# Patient Record
Sex: FEMALE | Race: BLACK OR AFRICAN AMERICAN | Employment: FULL TIME | URBAN - METROPOLITAN AREA
[De-identification: names, ages, dates, MRNs, and addresses within clinical notes are randomized per-mention and may not be internally consistent; named-entity substitution may affect disease eponyms.]

---

## 2019-08-14 ENCOUNTER — APPOINTMENT (OUTPATIENT)
Dept: GENERAL RADIOLOGY | Facility: CLINIC | Age: 59
End: 2019-08-14
Attending: EMERGENCY MEDICINE
Payer: COMMERCIAL

## 2019-08-14 ENCOUNTER — HOSPITAL ENCOUNTER (OUTPATIENT)
Facility: CLINIC | Age: 59
Setting detail: OBSERVATION
Discharge: HOME | End: 2019-08-14
Attending: EMERGENCY MEDICINE | Admitting: INTERNAL MEDICINE
Payer: COMMERCIAL

## 2019-08-14 VITALS
TEMPERATURE: 97.3 F | HEIGHT: 62 IN | OXYGEN SATURATION: 98 % | WEIGHT: 277.5 LBS | BODY MASS INDEX: 51.06 KG/M2 | RESPIRATION RATE: 18 BRPM | SYSTOLIC BLOOD PRESSURE: 117 MMHG | HEART RATE: 84 BPM | DIASTOLIC BLOOD PRESSURE: 82 MMHG

## 2019-08-14 DIAGNOSIS — R07.9 CHEST PAIN, UNSPECIFIED TYPE: ICD-10-CM

## 2019-08-14 LAB
ALBUMIN SERPL-MCNC: 3.3 G/DL (ref 3.4–5)
ALP SERPL-CCNC: 65 U/L (ref 40–150)
ALT SERPL W P-5'-P-CCNC: 25 U/L (ref 0–50)
ANION GAP SERPL CALCULATED.3IONS-SCNC: 6 MMOL/L (ref 3–14)
AST SERPL W P-5'-P-CCNC: 21 U/L (ref 0–45)
BASOPHILS # BLD AUTO: 0 10E9/L (ref 0–0.2)
BASOPHILS NFR BLD AUTO: 0.2 %
BILIRUB DIRECT SERPL-MCNC: <0.1 MG/DL (ref 0–0.2)
BILIRUB SERPL-MCNC: 0.2 MG/DL (ref 0.2–1.3)
BUN SERPL-MCNC: 17 MG/DL (ref 7–30)
CALCIUM SERPL-MCNC: 8.5 MG/DL (ref 8.5–10.1)
CHLORIDE SERPL-SCNC: 100 MMOL/L (ref 94–109)
CHOLEST SERPL-MCNC: 155 MG/DL
CO2 SERPL-SCNC: 31 MMOL/L (ref 20–32)
CREAT SERPL-MCNC: 0.76 MG/DL (ref 0.52–1.04)
CREAT SERPL-MCNC: 0.88 MG/DL (ref 0.52–1.04)
DIFFERENTIAL METHOD BLD: NORMAL
EOSINOPHIL # BLD AUTO: 0.2 10E9/L (ref 0–0.7)
EOSINOPHIL NFR BLD AUTO: 1.8 %
ERYTHROCYTE [DISTWIDTH] IN BLOOD BY AUTOMATED COUNT: 13.8 % (ref 10–15)
ETHANOL SERPL-MCNC: <0.01 G/DL
GFR SERPL CREATININE-BSD FRML MDRD: 73 ML/MIN/{1.73_M2}
GFR SERPL CREATININE-BSD FRML MDRD: 87 ML/MIN/{1.73_M2}
GLUCOSE BLDC GLUCOMTR-MCNC: 117 MG/DL (ref 70–99)
GLUCOSE SERPL-MCNC: 112 MG/DL (ref 70–99)
HCT VFR BLD AUTO: 37.2 % (ref 35–47)
HDLC SERPL-MCNC: 51 MG/DL
HGB BLD-MCNC: 12.7 G/DL (ref 11.7–15.7)
IMM GRANULOCYTES # BLD: 0 10E9/L (ref 0–0.4)
IMM GRANULOCYTES NFR BLD: 0.4 %
INTERPRETATION ECG - MUSE: NORMAL
LDLC SERPL CALC-MCNC: 83 MG/DL
LIPASE SERPL-CCNC: 260 U/L (ref 73–393)
LYMPHOCYTES # BLD AUTO: 1.9 10E9/L (ref 0.8–5.3)
LYMPHOCYTES NFR BLD AUTO: 17.9 %
MAGNESIUM SERPL-MCNC: 1.8 MG/DL (ref 1.6–2.3)
MCH RBC QN AUTO: 32.2 PG (ref 26.5–33)
MCHC RBC AUTO-ENTMCNC: 34.1 G/DL (ref 31.5–36.5)
MCV RBC AUTO: 94 FL (ref 78–100)
MONOCYTES # BLD AUTO: 0.8 10E9/L (ref 0–1.3)
MONOCYTES NFR BLD AUTO: 7.5 %
NEUTROPHILS # BLD AUTO: 7.5 10E9/L (ref 1.6–8.3)
NEUTROPHILS NFR BLD AUTO: 72.2 %
NONHDLC SERPL-MCNC: 104 MG/DL
NRBC # BLD AUTO: 0 10*3/UL
NRBC BLD AUTO-RTO: 0 /100
PHOSPHATE SERPL-MCNC: 2.9 MG/DL (ref 2.5–4.5)
PLATELET # BLD AUTO: 205 10E9/L (ref 150–450)
POTASSIUM SERPL-SCNC: 3.1 MMOL/L (ref 3.4–5.3)
POTASSIUM SERPL-SCNC: 3.6 MMOL/L (ref 3.4–5.3)
PROT SERPL-MCNC: 7.8 G/DL (ref 6.8–8.8)
RBC # BLD AUTO: 3.94 10E12/L (ref 3.8–5.2)
SODIUM SERPL-SCNC: 137 MMOL/L (ref 133–144)
TRIGL SERPL-MCNC: 106 MG/DL
TROPONIN I SERPL-MCNC: <0.015 UG/L (ref 0–0.04)
WBC # BLD AUTO: 10.4 10E9/L (ref 4–11)

## 2019-08-14 PROCEDURE — 25000132 ZZH RX MED GY IP 250 OP 250 PS 637: Performed by: INTERNAL MEDICINE

## 2019-08-14 PROCEDURE — 83735 ASSAY OF MAGNESIUM: CPT | Performed by: INTERNAL MEDICINE

## 2019-08-14 PROCEDURE — 36415 COLL VENOUS BLD VENIPUNCTURE: CPT | Performed by: INTERNAL MEDICINE

## 2019-08-14 PROCEDURE — 25000128 H RX IP 250 OP 636: Performed by: INTERNAL MEDICINE

## 2019-08-14 PROCEDURE — 25000128 H RX IP 250 OP 636: Performed by: EMERGENCY MEDICINE

## 2019-08-14 PROCEDURE — 99204 OFFICE O/P NEW MOD 45 MIN: CPT | Performed by: PSYCHIATRY & NEUROLOGY

## 2019-08-14 PROCEDURE — 80061 LIPID PANEL: CPT | Performed by: INTERNAL MEDICINE

## 2019-08-14 PROCEDURE — 85025 COMPLETE CBC W/AUTO DIFF WBC: CPT | Performed by: EMERGENCY MEDICINE

## 2019-08-14 PROCEDURE — 80076 HEPATIC FUNCTION PANEL: CPT | Performed by: EMERGENCY MEDICINE

## 2019-08-14 PROCEDURE — 71046 X-RAY EXAM CHEST 2 VIEWS: CPT

## 2019-08-14 PROCEDURE — C1894 INTRO/SHEATH, NON-LASER: HCPCS | Performed by: INTERNAL MEDICINE

## 2019-08-14 PROCEDURE — 99285 EMERGENCY DEPT VISIT HI MDM: CPT | Mod: 25

## 2019-08-14 PROCEDURE — 00000146 ZZHCL STATISTIC GLUCOSE BY METER IP

## 2019-08-14 PROCEDURE — 80048 BASIC METABOLIC PNL TOTAL CA: CPT | Performed by: EMERGENCY MEDICINE

## 2019-08-14 PROCEDURE — 83690 ASSAY OF LIPASE: CPT | Performed by: EMERGENCY MEDICINE

## 2019-08-14 PROCEDURE — 25000132 ZZH RX MED GY IP 250 OP 250 PS 637: Performed by: PSYCHIATRY & NEUROLOGY

## 2019-08-14 PROCEDURE — 25800030 ZZH RX IP 258 OP 636: Performed by: INTERNAL MEDICINE

## 2019-08-14 PROCEDURE — 40000235 ZZH STATISTIC TELEMETRY

## 2019-08-14 PROCEDURE — 93005 ELECTROCARDIOGRAM TRACING: CPT

## 2019-08-14 PROCEDURE — 84484 ASSAY OF TROPONIN QUANT: CPT | Performed by: EMERGENCY MEDICINE

## 2019-08-14 PROCEDURE — C1769 GUIDE WIRE: HCPCS | Performed by: INTERNAL MEDICINE

## 2019-08-14 PROCEDURE — 84132 ASSAY OF SERUM POTASSIUM: CPT | Performed by: INTERNAL MEDICINE

## 2019-08-14 PROCEDURE — 40000852 ZZH STATISTIC HEART CATH LAB OR EP LAB

## 2019-08-14 PROCEDURE — C1887 CATHETER, GUIDING: HCPCS | Performed by: INTERNAL MEDICINE

## 2019-08-14 PROCEDURE — 27210794 ZZH OR GENERAL SUPPLY STERILE: Performed by: INTERNAL MEDICINE

## 2019-08-14 PROCEDURE — 99236 HOSP IP/OBS SAME DATE HI 85: CPT | Performed by: INTERNAL MEDICINE

## 2019-08-14 PROCEDURE — 84484 ASSAY OF TROPONIN QUANT: CPT | Mod: 91 | Performed by: INTERNAL MEDICINE

## 2019-08-14 PROCEDURE — 99152 MOD SED SAME PHYS/QHP 5/>YRS: CPT | Performed by: INTERNAL MEDICINE

## 2019-08-14 PROCEDURE — 93458 L HRT ARTERY/VENTRICLE ANGIO: CPT | Performed by: INTERNAL MEDICINE

## 2019-08-14 PROCEDURE — 25000132 ZZH RX MED GY IP 250 OP 250 PS 637

## 2019-08-14 PROCEDURE — 80320 DRUG SCREEN QUANTALCOHOLS: CPT | Performed by: EMERGENCY MEDICINE

## 2019-08-14 PROCEDURE — 25000125 ZZHC RX 250: Performed by: INTERNAL MEDICINE

## 2019-08-14 PROCEDURE — 84100 ASSAY OF PHOSPHORUS: CPT | Performed by: INTERNAL MEDICINE

## 2019-08-14 PROCEDURE — G0378 HOSPITAL OBSERVATION PER HR: HCPCS

## 2019-08-14 PROCEDURE — 99204 OFFICE O/P NEW MOD 45 MIN: CPT | Mod: 25 | Performed by: INTERNAL MEDICINE

## 2019-08-14 PROCEDURE — 82565 ASSAY OF CREATININE: CPT | Performed by: INTERNAL MEDICINE

## 2019-08-14 PROCEDURE — 96374 THER/PROPH/DIAG INJ IV PUSH: CPT | Mod: 59

## 2019-08-14 RX ORDER — ARGATROBAN 1 MG/ML
150 INJECTION, SOLUTION INTRAVENOUS
Status: DISCONTINUED | OUTPATIENT
Start: 2019-08-14 | End: 2019-08-14 | Stop reason: HOSPADM

## 2019-08-14 RX ORDER — NITROGLYCERIN 5 MG/ML
VIAL (ML) INTRAVENOUS
Status: DISCONTINUED | OUTPATIENT
Start: 2019-08-14 | End: 2019-08-14 | Stop reason: HOSPADM

## 2019-08-14 RX ORDER — LIDOCAINE 40 MG/G
CREAM TOPICAL
Status: DISCONTINUED | OUTPATIENT
Start: 2019-08-14 | End: 2019-08-15 | Stop reason: HOSPADM

## 2019-08-14 RX ORDER — ACETAMINOPHEN 325 MG/1
650 TABLET ORAL EVERY 4 HOURS PRN
Status: DISCONTINUED | OUTPATIENT
Start: 2019-08-14 | End: 2019-08-15 | Stop reason: HOSPADM

## 2019-08-14 RX ORDER — ALPRAZOLAM 0.5 MG
0.5 TABLET ORAL 2 TIMES DAILY PRN
COMMUNITY

## 2019-08-14 RX ORDER — METOPROLOL TARTRATE 50 MG
50 TABLET ORAL DAILY
COMMUNITY

## 2019-08-14 RX ORDER — ACETAMINOPHEN 650 MG/1
650 SUPPOSITORY RECTAL EVERY 4 HOURS PRN
Status: DISCONTINUED | OUTPATIENT
Start: 2019-08-14 | End: 2019-08-15 | Stop reason: HOSPADM

## 2019-08-14 RX ORDER — NALOXONE HYDROCHLORIDE 0.4 MG/ML
.1-.4 INJECTION, SOLUTION INTRAMUSCULAR; INTRAVENOUS; SUBCUTANEOUS
Status: DISCONTINUED | OUTPATIENT
Start: 2019-08-14 | End: 2019-08-15 | Stop reason: HOSPADM

## 2019-08-14 RX ORDER — ALPRAZOLAM 0.5 MG
0.5 TABLET ORAL 2 TIMES DAILY PRN
Status: DISCONTINUED | OUTPATIENT
Start: 2019-08-14 | End: 2019-08-15 | Stop reason: HOSPADM

## 2019-08-14 RX ORDER — POTASSIUM CL/LIDO/0.9 % NACL 10MEQ/0.1L
10 INTRAVENOUS SOLUTION, PIGGYBACK (ML) INTRAVENOUS
Status: DISCONTINUED | OUTPATIENT
Start: 2019-08-14 | End: 2019-08-15 | Stop reason: HOSPADM

## 2019-08-14 RX ORDER — DOPAMINE HYDROCHLORIDE 160 MG/100ML
2-20 INJECTION, SOLUTION INTRAVENOUS CONTINUOUS PRN
Status: DISCONTINUED | OUTPATIENT
Start: 2019-08-14 | End: 2019-08-14 | Stop reason: HOSPADM

## 2019-08-14 RX ORDER — METOPROLOL TARTRATE 25 MG/1
25 TABLET, FILM COATED ORAL 2 TIMES DAILY
Status: DISCONTINUED | OUTPATIENT
Start: 2019-08-14 | End: 2019-08-15 | Stop reason: HOSPADM

## 2019-08-14 RX ORDER — ALBUTEROL SULFATE 90 UG/1
1-2 AEROSOL, METERED RESPIRATORY (INHALATION) EVERY 6 HOURS PRN
COMMUNITY

## 2019-08-14 RX ORDER — HYDROCODONE BITARTRATE AND ACETAMINOPHEN 5; 325 MG/1; MG/1
1 TABLET ORAL 2 TIMES DAILY PRN
COMMUNITY

## 2019-08-14 RX ORDER — LEVOTHYROXINE SODIUM 125 UG/1
125 TABLET ORAL DAILY
COMMUNITY

## 2019-08-14 RX ORDER — VERAPAMIL HYDROCHLORIDE 2.5 MG/ML
INJECTION, SOLUTION INTRAVENOUS
Status: DISCONTINUED | OUTPATIENT
Start: 2019-08-14 | End: 2019-08-14 | Stop reason: HOSPADM

## 2019-08-14 RX ORDER — EPTIFIBATIDE 2 MG/ML
180 INJECTION, SOLUTION INTRAVENOUS EVERY 10 MIN PRN
Status: DISCONTINUED | OUTPATIENT
Start: 2019-08-14 | End: 2019-08-14 | Stop reason: HOSPADM

## 2019-08-14 RX ORDER — NALOXONE HYDROCHLORIDE 0.4 MG/ML
.2-.4 INJECTION, SOLUTION INTRAMUSCULAR; INTRAVENOUS; SUBCUTANEOUS
Status: CANCELLED | OUTPATIENT
Start: 2019-08-14 | End: 2019-08-15

## 2019-08-14 RX ORDER — EPTIFIBATIDE 2 MG/ML
15 INJECTION, SOLUTION INTRAVENOUS CONTINUOUS PRN
Status: DISCONTINUED | OUTPATIENT
Start: 2019-08-14 | End: 2019-08-14 | Stop reason: HOSPADM

## 2019-08-14 RX ORDER — POLYETHYLENE GLYCOL 3350 17 G/17G
5 POWDER, FOR SOLUTION ORAL EVERY OTHER DAY
COMMUNITY

## 2019-08-14 RX ORDER — LEVOTHYROXINE SODIUM 125 UG/1
125 TABLET ORAL DAILY
Status: DISCONTINUED | OUTPATIENT
Start: 2019-08-15 | End: 2019-08-15 | Stop reason: HOSPADM

## 2019-08-14 RX ORDER — FLUTICASONE PROPIONATE 50 MCG
1 SPRAY, SUSPENSION (ML) NASAL DAILY PRN
COMMUNITY

## 2019-08-14 RX ORDER — POTASSIUM CHLORIDE 1.5 G/1.58G
20-40 POWDER, FOR SOLUTION ORAL
Status: DISCONTINUED | OUTPATIENT
Start: 2019-08-14 | End: 2019-08-15 | Stop reason: HOSPADM

## 2019-08-14 RX ORDER — POTASSIUM CHLORIDE 1500 MG/1
20-40 TABLET, EXTENDED RELEASE ORAL
Status: DISCONTINUED | OUTPATIENT
Start: 2019-08-14 | End: 2019-08-15 | Stop reason: HOSPADM

## 2019-08-14 RX ORDER — ATORVASTATIN CALCIUM 40 MG/1
40 TABLET, FILM COATED ORAL DAILY
Qty: 30 TABLET | Refills: 1 | Status: SHIPPED | OUTPATIENT
Start: 2019-08-14

## 2019-08-14 RX ORDER — FOLIC ACID 1 MG/1
1 TABLET ORAL DAILY
Status: DISCONTINUED | OUTPATIENT
Start: 2019-08-14 | End: 2019-08-15 | Stop reason: HOSPADM

## 2019-08-14 RX ORDER — LORAZEPAM 0.5 MG/1
0.5 TABLET ORAL
Status: DISCONTINUED | OUTPATIENT
Start: 2019-08-14 | End: 2019-08-14 | Stop reason: HOSPADM

## 2019-08-14 RX ORDER — ATROPINE SULFATE 0.1 MG/ML
0.5 INJECTION INTRAVENOUS EVERY 5 MIN PRN
Status: CANCELLED | OUTPATIENT
Start: 2019-08-14 | End: 2019-08-15

## 2019-08-14 RX ORDER — LANOLIN ALCOHOL/MO/W.PET/CERES
100 CREAM (GRAM) TOPICAL DAILY
Status: DISCONTINUED | OUTPATIENT
Start: 2019-08-14 | End: 2019-08-15 | Stop reason: HOSPADM

## 2019-08-14 RX ORDER — NITROGLYCERIN 0.4 MG/1
0.4 TABLET SUBLINGUAL EVERY 5 MIN PRN
Status: COMPLETED | OUTPATIENT
Start: 2019-08-14 | End: 2019-08-14

## 2019-08-14 RX ORDER — FENTANYL CITRATE 50 UG/ML
INJECTION, SOLUTION INTRAMUSCULAR; INTRAVENOUS
Status: DISCONTINUED | OUTPATIENT
Start: 2019-08-14 | End: 2019-08-14 | Stop reason: HOSPADM

## 2019-08-14 RX ORDER — ASPIRIN 81 MG/1
81 TABLET ORAL DAILY
Status: DISCONTINUED | OUTPATIENT
Start: 2019-08-15 | End: 2019-08-15 | Stop reason: HOSPADM

## 2019-08-14 RX ORDER — SODIUM CHLORIDE 9 MG/ML
INJECTION, SOLUTION INTRAVENOUS CONTINUOUS
Status: DISCONTINUED | OUTPATIENT
Start: 2019-08-14 | End: 2019-08-15 | Stop reason: HOSPADM

## 2019-08-14 RX ORDER — VENLAFAXINE HYDROCHLORIDE 75 MG/1
75 CAPSULE, EXTENDED RELEASE ORAL DAILY
COMMUNITY

## 2019-08-14 RX ORDER — NITROGLYCERIN 0.4 MG/1
TABLET SUBLINGUAL
Status: COMPLETED
Start: 2019-08-14 | End: 2019-08-14

## 2019-08-14 RX ORDER — POTASSIUM CHLORIDE 7.45 MG/ML
10 INJECTION INTRAVENOUS
Status: DISCONTINUED | OUTPATIENT
Start: 2019-08-14 | End: 2019-08-15 | Stop reason: HOSPADM

## 2019-08-14 RX ORDER — LORAZEPAM 2 MG/ML
0.5 INJECTION INTRAMUSCULAR ONCE
Status: COMPLETED | OUTPATIENT
Start: 2019-08-14 | End: 2019-08-14

## 2019-08-14 RX ORDER — LEVOTHYROXINE SODIUM 50 UG/1
50 TABLET ORAL DAILY
Status: DISCONTINUED | OUTPATIENT
Start: 2019-08-14 | End: 2019-08-14

## 2019-08-14 RX ORDER — HYDROCODONE BITARTRATE AND ACETAMINOPHEN 5; 325 MG/1; MG/1
1-2 TABLET ORAL EVERY 4 HOURS PRN
Status: DISCONTINUED | OUTPATIENT
Start: 2019-08-14 | End: 2019-08-15 | Stop reason: HOSPADM

## 2019-08-14 RX ORDER — HYDROCHLOROTHIAZIDE 50 MG/1
50 TABLET ORAL DAILY
COMMUNITY

## 2019-08-14 RX ORDER — METOPROLOL TARTRATE 50 MG
50-100 TABLET ORAL
Status: DISCONTINUED | OUTPATIENT
Start: 2019-08-14 | End: 2019-08-15 | Stop reason: HOSPADM

## 2019-08-14 RX ORDER — NITROGLYCERIN 0.4 MG/1
0.4 TABLET SUBLINGUAL EVERY 5 MIN PRN
Status: DISCONTINUED | OUTPATIENT
Start: 2019-08-14 | End: 2019-08-15 | Stop reason: HOSPADM

## 2019-08-14 RX ORDER — DOBUTAMINE HYDROCHLORIDE 200 MG/100ML
2-20 INJECTION INTRAVENOUS CONTINUOUS PRN
Status: DISCONTINUED | OUTPATIENT
Start: 2019-08-14 | End: 2019-08-14 | Stop reason: HOSPADM

## 2019-08-14 RX ORDER — LORAZEPAM 2 MG/ML
0.5 INJECTION INTRAMUSCULAR
Status: DISCONTINUED | OUTPATIENT
Start: 2019-08-14 | End: 2019-08-14 | Stop reason: HOSPADM

## 2019-08-14 RX ORDER — HEPARIN SODIUM 1000 [USP'U]/ML
INJECTION, SOLUTION INTRAVENOUS; SUBCUTANEOUS
Status: DISCONTINUED | OUTPATIENT
Start: 2019-08-14 | End: 2019-08-14 | Stop reason: HOSPADM

## 2019-08-14 RX ORDER — ALUMINA, MAGNESIA, AND SIMETHICONE 2400; 2400; 240 MG/30ML; MG/30ML; MG/30ML
30 SUSPENSION ORAL EVERY 4 HOURS PRN
Status: DISCONTINUED | OUTPATIENT
Start: 2019-08-14 | End: 2019-08-15 | Stop reason: HOSPADM

## 2019-08-14 RX ORDER — FLUMAZENIL 0.1 MG/ML
0.2 INJECTION, SOLUTION INTRAVENOUS
Status: CANCELLED | OUTPATIENT
Start: 2019-08-14 | End: 2019-08-15

## 2019-08-14 RX ORDER — MAGNESIUM SULFATE HEPTAHYDRATE 40 MG/ML
4 INJECTION, SOLUTION INTRAVENOUS EVERY 4 HOURS PRN
Status: DISCONTINUED | OUTPATIENT
Start: 2019-08-14 | End: 2019-08-15 | Stop reason: HOSPADM

## 2019-08-14 RX ORDER — ARGATROBAN 1 MG/ML
350 INJECTION, SOLUTION INTRAVENOUS
Status: DISCONTINUED | OUTPATIENT
Start: 2019-08-14 | End: 2019-08-14 | Stop reason: HOSPADM

## 2019-08-14 RX ORDER — METOPROLOL TARTRATE 50 MG
50-100 TABLET ORAL
Status: DISCONTINUED | OUTPATIENT
Start: 2019-08-14 | End: 2019-08-14

## 2019-08-14 RX ORDER — FENTANYL CITRATE 50 UG/ML
25-50 INJECTION, SOLUTION INTRAMUSCULAR; INTRAVENOUS
Status: CANCELLED | OUTPATIENT
Start: 2019-08-14 | End: 2019-08-15

## 2019-08-14 RX ORDER — NALOXONE HYDROCHLORIDE 0.4 MG/ML
.1-.4 INJECTION, SOLUTION INTRAMUSCULAR; INTRAVENOUS; SUBCUTANEOUS
Status: CANCELLED | OUTPATIENT
Start: 2019-08-14

## 2019-08-14 RX ORDER — NITROGLYCERIN 20 MG/100ML
.07-1.59 INJECTION INTRAVENOUS CONTINUOUS PRN
Status: DISCONTINUED | OUTPATIENT
Start: 2019-08-14 | End: 2019-08-14 | Stop reason: HOSPADM

## 2019-08-14 RX ORDER — CARISOPRODOL 350 MG/1
350 TABLET ORAL 2 TIMES DAILY PRN
COMMUNITY

## 2019-08-14 RX ORDER — SODIUM CHLORIDE 9 MG/ML
INJECTION, SOLUTION INTRAVENOUS CONTINUOUS
Status: DISCONTINUED | OUTPATIENT
Start: 2019-08-14 | End: 2019-08-14 | Stop reason: HOSPADM

## 2019-08-14 RX ORDER — POTASSIUM CHLORIDE 29.8 MG/ML
20 INJECTION INTRAVENOUS
Status: DISCONTINUED | OUTPATIENT
Start: 2019-08-14 | End: 2019-08-15 | Stop reason: HOSPADM

## 2019-08-14 RX ORDER — NOREPINEPHRINE BITARTRATE 0.06 MG/ML
.03-.4 INJECTION, SOLUTION INTRAVENOUS CONTINUOUS PRN
Status: DISCONTINUED | OUTPATIENT
Start: 2019-08-14 | End: 2019-08-14 | Stop reason: HOSPADM

## 2019-08-14 RX ORDER — MULTIPLE VITAMINS W/ MINERALS TAB 9MG-400MCG
1 TAB ORAL DAILY
Status: DISCONTINUED | OUTPATIENT
Start: 2019-08-14 | End: 2019-08-15 | Stop reason: HOSPADM

## 2019-08-14 RX ORDER — POTASSIUM CHLORIDE 1500 MG/1
20 TABLET, EXTENDED RELEASE ORAL
Status: DISCONTINUED | OUTPATIENT
Start: 2019-08-14 | End: 2019-08-14 | Stop reason: HOSPADM

## 2019-08-14 RX ORDER — LIDOCAINE 40 MG/G
CREAM TOPICAL
Status: DISCONTINUED | OUTPATIENT
Start: 2019-08-14 | End: 2019-08-14 | Stop reason: HOSPADM

## 2019-08-14 RX ORDER — LORAZEPAM 0.5 MG/1
0.5 TABLET ORAL 2 TIMES DAILY PRN
Status: DISCONTINUED | OUTPATIENT
Start: 2019-08-14 | End: 2019-08-14

## 2019-08-14 RX ORDER — VENLAFAXINE HYDROCHLORIDE 75 MG/1
75 CAPSULE, EXTENDED RELEASE ORAL
Status: DISCONTINUED | OUTPATIENT
Start: 2019-08-14 | End: 2019-08-15 | Stop reason: HOSPADM

## 2019-08-14 RX ADMIN — NITROGLYCERIN 0.4 MG: 0.4 TABLET SUBLINGUAL at 04:58

## 2019-08-14 RX ADMIN — LEVOTHYROXINE SODIUM 50 MCG: 50 TABLET ORAL at 10:08

## 2019-08-14 RX ADMIN — LORAZEPAM 0.5 MG: 2 INJECTION INTRAMUSCULAR; INTRAVENOUS at 06:23

## 2019-08-14 RX ADMIN — METOPROLOL TARTRATE 25 MG: 25 TABLET ORAL at 10:07

## 2019-08-14 RX ADMIN — Medication 100 MG: at 11:36

## 2019-08-14 RX ADMIN — FOLIC ACID 1 MG: 1 TABLET ORAL at 11:36

## 2019-08-14 RX ADMIN — POTASSIUM CHLORIDE 40 MEQ: 1500 TABLET, EXTENDED RELEASE ORAL at 10:15

## 2019-08-14 RX ADMIN — MULTIPLE VITAMINS W/ MINERALS TAB 1 TABLET: TAB at 11:36

## 2019-08-14 RX ADMIN — NITROGLYCERIN 0.4 MG: 0.4 TABLET SUBLINGUAL at 04:53

## 2019-08-14 RX ADMIN — VENLAFAXINE HYDROCHLORIDE 75 MG: 75 CAPSULE, EXTENDED RELEASE ORAL at 11:36

## 2019-08-14 RX ADMIN — NITROGLYCERIN 0.4 MG: 0.4 TABLET SUBLINGUAL at 09:08

## 2019-08-14 RX ADMIN — SODIUM CHLORIDE: 9 INJECTION, SOLUTION INTRAVENOUS at 12:53

## 2019-08-14 RX ADMIN — NITROGLYCERIN 0.4 MG: 0.4 TABLET SUBLINGUAL at 05:11

## 2019-08-14 RX ADMIN — METOPROLOL TARTRATE 25 MG: 25 TABLET ORAL at 20:40

## 2019-08-14 RX ADMIN — NITROGLYCERIN 0.4 MG: 0.4 TABLET SUBLINGUAL at 09:20

## 2019-08-14 ASSESSMENT — ENCOUNTER SYMPTOMS
DIAPHORESIS: 0
SHORTNESS OF BREATH: 1
NERVOUS/ANXIOUS: 1
LIGHT-HEADEDNESS: 0

## 2019-08-14 ASSESSMENT — MIFFLIN-ST. JEOR: SCORE: 1796.98

## 2019-08-14 NOTE — PRE-PROCEDURE
I have examined the patient, reviewed the history, medications and pre procedural tests. Prolonged recurrent chest pain with negative troponin levels, but known CAD documented previously on CTA.  I have explained to the patient the risks of death, MI, stroke, hematoma, possible urgent bypass surgery for failed PCI, use of stents, thienopyridine agents, possible peripheral vascular complications, arrhythmia, the use of FFR in clinical decision-making and alternative of medical therapy alone in regards to left heart catheterization, left ventriculography, coronary angiography, and possible percutaneous coronary intervention. The patient voiced understanding and wishes to proceed. The patient has a good right radial pulse, normal ulnar pulse and a normal Nayan's sign.

## 2019-08-14 NOTE — PROGRESS NOTES
PRIOR TO DISCHARGE     Comments: List all goals to be met before discharge home:   - Serial troponins and stress test complete.  NOT MET  - Seen and cleared by consultant if applicable NOT MET  - Adequate pain control on oral analgesia MET  - Vital signs normal or at patient baseline MET  - Safe disposition plan has been identified NOT MET  - Nurse to notify provider when observation goals  have been met and patient is ready for discharge.

## 2019-08-14 NOTE — PROCEDURES
[facility]    Procedure: *Cath without PCI  Date/Time: 8/14/2019 3:33 PM  Performed by: Yordan Flores MD  Authorized by: Yordan Flores MD     UNIVERSAL PROTOCOL   Site Marked: Yes  Prior Images Obtained and Reviewed:  Yes  Required items: Required blood products, implants, devices and special equipment available    Patient identity confirmed:  Verbally with patient  Patient was reevaluated immediately before administering moderate or deep sedation or anesthesia  Confirmation Checklist:  Patient's identity using two indicators  Time out: Immediately prior to the procedure a time out was called    Preparation: Patient was prepped and draped in usual sterile fashion       ANESTHESIA    Local Anesthetic: Lidocaine 1% without epinephrine      SEDATION    Patient Sedated: Yes    Sedation:  Midazolam and fentanyl  Vital signs: Vital signs monitored during sedation    PROCEDURE   Patient Tolerance:  Patient tolerated the procedure well with no immediate complications    Time of Sedation in Minutes by Physician:  30

## 2019-08-14 NOTE — PROGRESS NOTES
RECEIVING UNIT ED HANDOFF REVIEW    ED Nurse Handoff Report was reviewed by: George Kowalski on August 14, 2019 at 6:50 AM

## 2019-08-14 NOTE — PROGRESS NOTES
1535 Pt returned from Cath Lab. A/O. TR band intact to right wrist.  No oozing or hematoma noted. Area soft & flat. Right arm elevated on pillows. Arm board applied. Pt denies pain. Pt instructed on activity restrictions with right wrist. Verbal understanding received from pt.  1545 Dr Flores at bedside to speak with pt.  1600 Pt's friend at bedside. Detailed update given. No family present at this time.  1650 Pt taking diet & flds well. No complaints.  1700 Air released from TR band per protocol.  1715 OOB - steady on feet. Ambulated in halls to bathroom with good hood. No change in puncture site assessment with activity.  1845 TR band removed. Bandaid applied to site. Area soft & flat.  1855 Detailed report called to BLESSING Weems in OBS.  1920 Pt transferred to OBS rm 22. All personal belongings sent w/ pt.  1930 Verbal report given to BLESSING Burgos. Site checked together. Bandaid CDI - area soft & flat.

## 2019-08-14 NOTE — CONSULTS
"Cardiology Consultation      Dulce Maria Sandhu MRN# [id number]   YOB: 1960 Age: 58 year old   Date of Admission: 2019     Reason for consult: Chest pain           Assessment and Plan:   Active Problems:    * No active hospital problems. *       1. Chest discomfort    Characteristics c/w angina with radiation to left arm, SOB    However with duration of discomfort and negative EKG and troponins make noncardiac more likely    Prior abnormal stress test    Discussed invasive vs. CTA    Patient prefers to go with CT angiogram    BB given    Will need anti anxiety meds    Patient with significant stress      2. HTN              3. PACs              4. Obesity              5. Anxiety disorder                     Chief Complaint:   Chest Pain (Woke up with chest pain that radiates into left shoulder and feels SOB. anxious. Given 324mg ASA by EMS.)           History of Present Illness:   This patient is a 58 year old female with prior history of hypertension, presents with chest pressure awakening her at 2am.  Persisting even to now however improved with 3 sl ntg.  No EKG changes. Troponin negative.  Prior stress nuclear scan  showing anterior ischemia mild however likely breast attenuation artifact.  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  Otherwise ROS negative.          Physical Exam:   Vitals were reviewed  Blood pressure 137/78, pulse 81, temperature 96.4  F (35.8  C), temperature source Oral, resp. rate 20, height 1.575 m (5' 2\"), weight 125.9 kg (277 lb 8 oz), SpO2 100 %.  Temperatures:  Current - Temp: 96.4  F (35.8  C); Max - Temp  Av.6  F (36.4  C)  Min: 96.4  F (35.8  C)  Max: 98.8  F (37.1  C)  Respiration range: Resp  Avg: 15.8  Min: 10  Max: 24  Pulse range: Pulse  Av.8  Min: 81  Max: 98  Blood pressure range: Systolic (24hrs), Av , Min:109 , Max:163   ; Diastolic (24hrs), Av, Min:68, Max:101    Pulse oximetry range: " SpO2  Av %  Min: 98 %  Max: 100 %  No intake or output data in the 24 hours ending 19 0856  Constitutional:   awake, alert, cooperative, no apparent distress, and appears stated age     Eyes:   Lids and lashes normal, pupils equal, round and reactive to light, extra ocular muscles intact, sclera clear, conjunctiva normal     Neck:   supple, symmetrical, trachea midline, no JVD     Back:   symmetric     Lungs:   No increased work of breathing, good air exchange, clear to auscultation bilaterally, no crackles or wheezing  clear to auscultation     Cardiovascular:   Normal apical impulse, regular rate and rhythm, normal S1 and S2, no S3 or S4, and no murmur noted.   , , , pulses 2 plus all extermities bilaterally  carotids without bruits bilaterally     Abdomen:   non-tender     Musculoskeletal:   motor strength is 5 out of 5 all extremities bilaterally     Neurologic:   Grossly nonfocal     Skin:   no bruising or bleeding     Additional findings:   Edema   No edema               Past Medical History:   I have reviewed this patient's past medical history  Past Medical History:   Diagnosis Date     Anxiety      Asthma      Hypertension      Sleep apnea     uses c-pap     Thyroid disease              Past Surgical History:   I have reviewed this patient's past surgical history  Past Surgical History:   Procedure Laterality Date     COLONOSCOPY  10/24/2012    Procedure: COLONOSCOPY;  COLONOSCOPY ;  Surgeon: Sunil Valdes MD;  Location:  GI     GYN SURGERY      3 c sections     THYROID SURGERY                 Social History:   I have reviewed this patient's social history  Social History     Tobacco Use     Smoking status: Current Some Day Smoker     Packs/day: 0.50     Years: 35.00     Pack years: 17.50     Types: Cigarettes     Smokeless tobacco: Never Used   Substance Use Topics     Alcohol use: Yes     Comment: 3 bottles vodka a week             Family History:   I have reviewed this patient's  family history  Family History   Problem Relation Age of Onset     Cerebrovascular Disease Mother      Cerebrovascular Disease Brother      Cerebrovascular Disease Sister              Allergies:     Allergies   Allergen Reactions     Sulfa Drugs Hives             Medications:   I have reviewed this patient's current medications  Medications Prior to Admission   Medication Sig Dispense Refill Last Dose     Carisoprodol (SOMA PO) Take 350 mg by mouth as needed.     8/13/2019 at Unknown time     HYDROcodone-acetaminophen (LORTAB)  MG per tablet Take 1 tablet by mouth every 6 hours as needed.     1/10/2014     LEVOTHYROXINE SODIUM PO Take 50 mcg by mouth    8/13/2019 at Unknown time     LORazepam (ATIVAN PO) Take  by mouth.     Past Week     METOPROLOL SUCCINATE PO Take by mouth daily        potassium chloride SA (K-DUR,KLOR-CON M) 10 MEQ tablet Take by mouth 2 times daily   8/13/2019 at Unknown time     albuterol (2.5 MG/3ML) 0.083% nebulizer solution Take 3 mLs (2.5 mg) by nebulization every 4 hours as needed for shortness of breath / dyspnea 1 Box 0      ALBUTEROL IN Inhale  into the lungs.     1/11/2014     Current Facility-Administered Medications Ordered in Epic   Medication Dose Route Frequency Last Rate Last Dose     acetaminophen (TYLENOL) Suppository 650 mg  650 mg Rectal Q4H PRN         acetaminophen (TYLENOL) tablet 650 mg  650 mg Oral Q4H PRN         alum & mag hydroxide-simethicone (MYLANTA ES/MAALOX  ES) suspension 30 mL  30 mL Oral Q4H PRN         [START ON 8/15/2019] aspirin EC tablet 81 mg  81 mg Oral Daily         levothyroxine (SYNTHROID/LEVOTHROID) tablet 50 mcg  50 mcg Oral Daily         lidocaine (LMX4) cream   Topical Q1H PRN         lidocaine 1 % 0.1-1 mL  0.1-1 mL Other Q1H PRN         LORazepam (ATIVAN) tablet 0.5 mg  0.5 mg Oral BID PRN         metoprolol tartrate (LOPRESSOR) tablet 25 mg  25 mg Oral BID         naloxone (NARCAN) injection 0.1-0.4 mg  0.1-0.4 mg Intravenous Q2 Min PRN          nitroGLYcerin (NITROSTAT) sublingual tablet 0.4 mg  0.4 mg Sublingual Q5 Min PRN         potassium chloride (KLOR-CON) Packet 20-40 mEq  20-40 mEq Oral or Feeding Tube Q2H PRN         potassium chloride 10 mEq in 100 mL intermittent infusion with 10 mg lidocaine  10 mEq Intravenous Q1H PRN         potassium chloride 10 mEq in 100 mL sterile water intermittent infusion (premix)  10 mEq Intravenous Q1H PRN         potassium chloride 20 mEq in 50 mL intermittent infusion  20 mEq Intravenous Q1H PRN         potassium chloride ER (K-DUR/KLOR-CON M) CR tablet 20-40 mEq  20-40 mEq Oral Q2H PRN         sodium chloride (PF) 0.9% PF flush 3 mL  3 mL Intracatheter q1 min prn         sodium chloride (PF) 0.9% PF flush 3 mL  3 mL Intracatheter Q8H         No current Epic-ordered outpatient medications on file.             Review of Systems:   The 10 point Review of Systems is negative other than noted in the HPI            Data:   All laboratory data reviewed  Results for orders placed or performed during the hospital encounter of 08/14/19 (from the past 24 hour(s))   EKG 12 lead   Result Value Ref Range    Interpretation ECG Click View Image link to view waveform and result    CBC with platelets differential   Result Value Ref Range    WBC 10.4 4.0 - 11.0 10e9/L    RBC Count 3.94 3.8 - 5.2 10e12/L    Hemoglobin 12.7 11.7 - 15.7 g/dL    Hematocrit 37.2 35.0 - 47.0 %    MCV 94 78 - 100 fl    MCH 32.2 26.5 - 33.0 pg    MCHC 34.1 31.5 - 36.5 g/dL    RDW 13.8 10.0 - 15.0 %    Platelet Count 205 150 - 450 10e9/L    Diff Method Automated Method     % Neutrophils 72.2 %    % Lymphocytes 17.9 %    % Monocytes 7.5 %    % Eosinophils 1.8 %    % Basophils 0.2 %    % Immature Granulocytes 0.4 %    Nucleated RBCs 0 0 /100    Absolute Neutrophil 7.5 1.6 - 8.3 10e9/L    Absolute Lymphocytes 1.9 0.8 - 5.3 10e9/L    Absolute Monocytes 0.8 0.0 - 1.3 10e9/L    Absolute Eosinophils 0.2 0.0 - 0.7 10e9/L    Absolute Basophils 0.0 0.0 - 0.2  10e9/L    Abs Immature Granulocytes 0.0 0 - 0.4 10e9/L    Absolute Nucleated RBC 0.0    Basic metabolic panel   Result Value Ref Range    Sodium 137 133 - 144 mmol/L    Potassium 3.1 (L) 3.4 - 5.3 mmol/L    Chloride 100 94 - 109 mmol/L    Carbon Dioxide 31 20 - 32 mmol/L    Anion Gap 6 3 - 14 mmol/L    Glucose 112 (H) 70 - 99 mg/dL    Urea Nitrogen 17 7 - 30 mg/dL    Creatinine 0.88 0.52 - 1.04 mg/dL    GFR Estimate 73 >60 mL/min/[1.73_m2]    GFR Estimate If Black 85 >60 mL/min/[1.73_m2]    Calcium 8.5 8.5 - 10.1 mg/dL   Troponin I   Result Value Ref Range    Troponin I ES <0.015 0.000 - 0.045 ug/L   Hepatic panel   Result Value Ref Range    Bilirubin Direct <0.1 0.0 - 0.2 mg/dL    Bilirubin Total 0.2 0.2 - 1.3 mg/dL    Albumin 3.3 (L) 3.4 - 5.0 g/dL    Protein Total 7.8 6.8 - 8.8 g/dL    Alkaline Phosphatase 65 40 - 150 U/L    ALT 25 0 - 50 U/L    AST 21 0 - 45 U/L   Alcohol ethyl   Result Value Ref Range    Ethanol g/dL <0.01 <0.01 g/dL   Lipase   Result Value Ref Range    Lipase 260 73 - 393 U/L   XR Chest 2 Views    Narrative    XR CHEST 2 VW  8/14/2019 5:08 AM     INDICATION: Chest pain.    COMPARISON: 1/11/2014.      Impression    IMPRESSION: Shallow inspiration accentuates the heart size. No focal  air-space disease or other acute findings.    OMEGA BORGES MD   Troponin I - Now then in 4 hours x 2   Result Value Ref Range    Troponin I ES <0.015 0.000 - 0.045 ug/L   Lipid panel reflex to direct LDL   Result Value Ref Range    Cholesterol 155 <200 mg/dL    Triglycerides 106 <150 mg/dL    HDL Cholesterol 51 >49 mg/dL    LDL Cholesterol Calculated 83 <100 mg/dL    Non HDL Cholesterol 104 <130 mg/dL     EKG results:   I have reviewed this patient's EKG with the following interpretation:        NSR, PACs , no acute ST changes     Echocardiology:   Not performed to date        Results:

## 2019-08-14 NOTE — DISCHARGE INSTRUCTIONS

## 2019-08-14 NOTE — PROGRESS NOTES
Care Suites Admission Nursing Note    Reason for admission: Cath lab hold  CS arrival time: ~1430  Accompanied by: Friend Christi Song  Name/phone of DC : Here  Medications held: No  Consent signed: Yes  Abnormal assessment/labs: NO  If abnormal, provider notified: NO  Education/questions answered: Dr. Flores explained the procedure and addressed questions and concerns  Plan: Angiogram ~1500

## 2019-08-14 NOTE — PRE-PROCEDURE
GENERAL PRE-PROCEDURE:   Procedure:  Coronary angiogram,  possibel Left heart catheterization, possible Left ventriculogram, possible PCI  Date/Time:  8/14/2019 2:40 PM    Verbal consent obtained?: Yes    Written consent obtained?: Yes    Risks and benefits: Risks, benefits and alternatives were discussed    Consent given by:  Patient  Patient states understanding of procedure being performed: Yes    Patient's understanding of procedure matches consent: Yes    Procedure consent matches procedure scheduled: Yes    Expected level of sedation:  Moderate  Appropriately NPO:  Yes  ASA Class:  Class 2- mild systemic disease, no acute problems, no functional limitations  Mallampati  :  Grade 2- soft palate, base of uvula, tonsillar pillars, and portion of posterior pharyngeal wall visible  Lungs:  Lungs clear with good breath sounds bilaterally  Heart:  Normal heart sounds and rate  History & Physical reviewed:  History and physical reviewed and no updates needed  Statement of review:  I have reviewed the lab findings, diagnostic data, medications, and the plan for sedation

## 2019-08-14 NOTE — ED PROVIDER NOTES
History     Chief Complaint:  Chest Pain     The history is provided by the patient and the spouse.      Dulce Maria Sandhu is a 58 year old female with a history of anxiety and hypertension who presents to the emergency department via EMS for evaluation of chest pain. 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 Prior to arrival, EMS states that the patient woke up around 0100 with the sudden onset of left shoulder pain that developed into intense chest pressure and shortness of breath. She notes that she has never experienced this feeling before. She then called EMS and was very anxious when they arrived. Upon arrival, she was given 324 mg of Aspirin and was found to have stable vitals with O2 sats at 100. With just the Aspirin, her chest pain went from an 8/10 to a 4/10 on pain severity scale. They then transferred here for further evaluation. EMS notes that her shortness of breath and anxiety has subsided since they first arrived.     Here, the patient still has slight chest pressure rated at a 4/10 that no longer radiates anywhere else. Patient denies any diaphoresis or lightheadedness. She also does not get much exercise. She occasionally takes medication for her anxiety and regularly takes hypertension medication in the morning. To note, her last stress test was in 2015.    Cardiac/PE/DVT Risk Factors:  The patient has a history of hypertension, but no history hyperlipidemia or diabetes. She is a heavy smoker. The patient's mother had a blood clot but no personal history of PE, DVT, or clotting disorder. The patient reports denies recent travel, surgery, or other immobilizations. She does not take any estrogen or hormonal supplements.     Allergies:  Sulfa drugs      Medications:    Albuterol  Metoprolol  Ativan  Levothyroxine     Past Medical History:    Anxiety  Asthma  Hypertension  Sleep apnea  Thyroid  "disease    Past Surgical History:     x3  Thyroid surgery  Colonoscopy     Family History:    Cerebrovascular disease    Social History:  Positive for tobacco use: 0.5 ppd  Positive for alcohol use: 3 L of vodka per week  Negative for drug use.  Marital Status:        Review of Systems   Constitutional: Negative for diaphoresis.   Respiratory: Positive for shortness of breath.    Cardiovascular: Positive for chest pain.   Neurological: Negative for light-headedness.   Psychiatric/Behavioral: The patient is nervous/anxious.    All other systems reviewed and are negative.      Physical Exam     Patient Vitals for the past 24 hrs:   BP Temp Temp src Pulse Heart Rate Resp SpO2 Height   19 0600 109/72 -- -- 83 81 12 -- --   19 0520 (!) 143/88 -- -- 90 82 10 -- --   19 0510 (!) 144/82 -- -- 91 -- -- -- --   19 0500 (!) 144/88 -- -- 92 90 12 -- --   19 0455 136/70 -- -- 98 92 11 -- --   19 0440 (!) 163/100 -- -- 91 83 15 -- --   19 0428 (!) 147/87 -- -- -- -- -- -- --   19 0424 -- -- -- -- 83 20 99 % --   19 0421 -- 98.8  F (37.1  C) Oral -- -- -- -- 1.575 m (5' 2\")     Physical Exam    Eye:  Pupils are equal, round, and reactive.  Extraocular movements intact.    ENT:  No rhinorrhea.  Moist mucus membranes.  Normal tongue and tonsil.    Cardiac:  Regular rate and rhythm.  No murmurs, gallops, or rubs.    Pulmonary:  Clear to auscultation bilaterally.  No wheezes, rales, or rhonchi.    Abdomen:  Positive bowel sounds.  Abdomen is soft and non-distended, without focal tenderness.    Musculoskeletal:  Normal movement of all extremities without evidence for deficit.  No lower extremity edema or asymmetry    Skin:  Warm and dry without rashes.    Neurologic:  Non-focal exam without asymmetric weakness or numbness.     Psychiatric: Anxious affect with otherwise appropriate interaction with examiner.    Emergency Department Course   ECG:  Indication: Chest " Pain  Time: 0419  Vent. Rate 82 bpm. UT interval 188. QRS duration 90. QT/QTc 396/462. P-R-T axis 59 37 57. Sinus rhythm with premature atrial complexes. Read time: 0421    Imaging:  XR Chest, G/E 2 views:   Shallow inspiration accentuates the heart size. No focal air-space disease or other acute findings. As per radiology.     Laboratory:  CBC: WBC: 10.4, HGB: 12.7, PLT: 205  BMP: Glucose 112 (H), Potassium: 3.1 (L), o/w WNL (Creatinine: 0.88)    Lipase: 260  Alcohol Level: <0.01    Hepatic Panel: Albumin: 3.3 (L), o/w Negative    0451 Troponin: <0.015    Interventions:  0453 Nitrostat 0.4 mg Sublingual   0458 Nitrostat 0.4 mg Sublingual   0511 Nitrostat 0.4 mg Sublingual     Emergency Department Course:  Nursing notes and vitals reviewed. 0440 I performed an exam of the patient as documented above.     IV inserted. Medicine administered as documented above. Blood drawn. This was sent to the lab for further testing, results above.    The patient was sent for a chest x-ray while in the emergency department, findings above.     EKG obtained in the ED, see results above.     0530 I rechecked the patient and discussed the results of her workup thus far.     0600  I consulted with Dr. Gary of the hospitalist services. They are in agreement to accept the patient for admission.    Findings and plan explained to the Patient and spouse who consents to admission. Discussed the patient with Dr. Gary, who will admit the patient to an observation bed for further monitoring, evaluation, and treatment.    Impression & Plan    Medical Decision Making:   This unfortunate overweight 58-year-old woman with multiple cardiac risk factors presents to us when she awoke with left shoulder pain which radiates into her chest with associated shortness of breath.  She was seen for similar symptoms approximately 4 years ago and was found at that time to have an abnormal stress test and CT coronary angiogram.  She has not had any  significant follow-up since.    She was given nitroglycerin with resolution of her chest pain.  EKG and initial troponin are normal.  Chest x-ray is clear.  This seems unlikely to be representative of pulmonary embolism or aortic dissection.  Because of her multiple risk factors and elevated HEART score, I feel admission to the hospital is prudent.  I spoke with Dr. Gary of the hospitalist service who admit the patient under observation status for formal rule out and cardiology consultation.    Diagnosis:    ICD-10-CM    1. Chest pain, unspecified type R07.9        Disposition:  Admitted to Dr. Gary    Scribe Disclosure:  I, Daniela Moffett, am serving as a scribe on 8/14/2019 at 4:56 AM to personally document services performed by Trierweiler, Chad A, MD based on my observations and the provider's statements to me.     Daniela Moffett  8/14/2019    EMERGENCY DEPARTMENT       Trierweiler, Chad A, MD  08/14/19 [id number]

## 2019-08-14 NOTE — ED NOTES
Bed: ED20  Expected date: 8/14/19  Expected time: 4:15 AM  Means of arrival: Ambulance  Comments:  HEMS [id number] CP

## 2019-08-14 NOTE — ED NOTES
"[facility]  ED Nurse Handoff Report    ED Chief complaint: Chest Pain (Woke up with chest pain that radiates into left shoulder and feels SOB. anxious. Given 324mg ASA by EMS.)      ED Diagnosis:   Final diagnoses:   None       Code Status: Full Code    Allergies:   Allergies   Allergen Reactions     Sulfa Drugs Hives       Activity level - Baseline/Home:  Independent  Activity Level - Current:   Independent    Patient's Preferred language: English   Needed?: No    Isolation: No  Infection: Not Applicable  Bariatric?: No    Vital Signs:   Vitals:    08/14/19 0440 08/14/19 0455 08/14/19 0500 08/14/19 0510   BP: (!) 163/100 136/70 (!) 144/88 (!) 144/82   Pulse: 91 98 92 91   Resp: 15 11 12    Temp:       TempSrc:       SpO2:       Height:           Cardiac Rhythm: ,        Pain level: 0-10 Pain Scale: 4    Is this patient confused?: No   Does this patient have a guardian?  No         If yes, is there guardianship documents in the Epic \"Code/ACP\" activity?  N/A         Guardian Notified?  N/A  [place] - Suicide Severity Rating Scale Completed?  Yes  If yes, what color did the patient score?  White    Patient Report: Initial Complaint: Pt presents with left sided chest pressure that radiates into left shoulder with associated SOB.  Focused Assessment: Pt with left sided chest pressure and SOB. Pt mildly anxious. Lungs clear.  Tests Performed: labs and chest x-ray; EKG shows SR with PACs  Abnormal Results: see EPIC  Treatments provided: 3 nitroglycerin that took her pain from 4 down to 0. She was given 324mg ASA from EMS. 0.5mg iv ativan for anxiety.    Family Comments:  present and supportive.    OBS brochure/video discussed/provided to patient/family: No              Name of person given brochure if not patient: N/A              Relationship to patient: N/A    ED Medications:   Medications   nitroGLYcerin (NITROSTAT) sublingual tablet 0.4 mg (0.4 mg Sublingual Given 8/14/19 0511) "       Drips infusing?:  No    For the majority of the shift this patient was Green.   Interventions performed were reassurance and information.    Severe Sepsis OR Septic Shock Diagnosis Present: No    To be done/followed up on inpatient unit:  none pending    ED NURSE PHONE NUMBER: [phone]

## 2019-08-14 NOTE — PHARMACY-ADMISSION MEDICATION HISTORY
Admission medication history interview status for the 8/14/2019  admission is complete. See EPIC admission navigator for prior to admission medications     Medication history source reliability:Moderate    Actions taken by pharmacist (provider contacted, etc):Verified all of patient's Rx medications with retail pharmacy records in [facility]. Also called patient's retail pharmacy ([facility]) to confirm instructions of Effexor and last fill date of metoprolol.      Additional medication history information not noted on PTA med list :  1) Patient's prescription for metoprolol tartrate states that she should be taking 50mg twice daily. Patient states that she only takes 50mg once daily.  2) Patient's prescription for Effexore ER 75mg states that she should take 2 x 75mg for a total of 150mg daily. Patient states she only takes 75mg once daily.     Medication reconciliation/reorder completed by provider prior to medication history? No    Time spent in this activity: 30 minutes    Prior to Admission medications    Medication Sig Last Dose Taking? Auth Provider   albuterol (PROAIR HFA/PROVENTIL HFA/VENTOLIN HFA) 108 (90 Base) MCG/ACT inhaler Inhale 1-2 puffs into the lungs every 6 hours as needed for shortness of breath / dyspnea or wheezing prn med Yes Unknown, Entered By History   ALPRAZolam (XANAX) 0.5 MG tablet Take 0.5 mg by mouth 2 times daily as needed for anxiety prn med Yes Unknown, Entered By History   Aspirin Effervescent 325 MG TBEF Take 325 mg by mouth daily (Leatha Cotto) 8/13/2019 at Unknown time Yes Unknown, Entered By History   carisoprodol (SOMA) 350 MG tablet Take 350 mg by mouth 2 times daily as needed for muscle spasms prn med Yes Unknown, Entered By History   fluticasone (FLONASE) 50 MCG/ACT nasal spray Spray 1 spray into both nostrils daily as needed for rhinitis or allergies prn med Yes Unknown, Entered By History   hydrochlorothiazide (HYDRODIURIL) 50 MG tablet Take 50 mg by mouth daily 8/13/2019 at  Unknown time Yes Unknown, Entered By History   HYDROcodone-acetaminophen (NORCO) 5-325 MG tablet Take 1 tablet by mouth 2 times daily as needed for severe pain 8/13/2019 at Unknown time Yes Unknown, Entered By History   levothyroxine (SYNTHROID/LEVOTHROID) 125 MCG tablet Take 125 mcg by mouth daily 8/13/2019 at Unknown time Yes Unknown, Entered By History   metoprolol tartrate (LOPRESSOR) 50 MG tablet Take 50 mg by mouth daily (Per retail pharmacy, patient's instructions are to take one tablet twice daily but the patient states she only takes one tablet daily) 8/13/2019 at Unknown time Yes Unknown, Entered By History   polyethylene glycol (MIRALAX/GLYCOLAX) powder Take 5 g by mouth every other day Mixes with Metamucil in liquid every other day. Past Week at Unknown time Yes Unknown, Entered By History   potassium chloride SA (K-DUR,KLOR-CON M) 10 MEQ tablet Take 20 mEq by mouth daily  8/13/2019 at Unknown time Yes Reported, Patient   psyllium (METAMUCIL/KONSYL) 58.6 % powder Take 1 Tablespoonful by mouth every other day Mixes with Miralax in liquid every other day. Past Week at Unknown time Yes Unknown, Entered By History   venlafaxine (EFFEXOR-XR) 75 MG 24 hr capsule Take 75 mg by mouth daily (Per pharmacy instructions, patient is to take 2 x 75mg for a total of 150mg daily but patient states she only takes 75mg daily) 8/13/2019 at Unknown time Yes Unknown, Entered By History

## 2019-08-14 NOTE — H&P
Admitted:     08/14/2019      PRIMARY CARE PHYSICIAN:  [facility].      CHIEF COMPLAINT:  Chest pain.      HISTORY OF PRESENT ILLNESS:  Had been obtained from the patient who is a good historian.  I did discuss with ER attending, Dr. Trierweiler, and I reviewed her chart as well.      Ms. Dulce Maria Carrasco is a very pleasant 58-year-old female with past medical history of hypertension, coronary artery disease based on abnormal stress test and abnormal CT angiogram/calcium score back in 2013, history of obesity, obstructive sleep apnea, hypothyroidism, tobacco use disorder and alcohol abuse who came in for evaluation of chest pain.  The patient reports that she has been under a lot of stress at work recently.  She reports that yesterday, Tuesday, 08/13/2019, she again had to deal with a significant amount of work-related stress.  She reports that she went to sleep as usual, but then she woke up around 2:00 a.m. having some left shoulder ache.  She also reports that she started having some chest pressure located on the left side of the chest associated with numbness of her left arm and shortness of breath.  She got up, she went to the kitchen, she drank some water.  She tried to lie down again, but she felt that the chest pain was getting worse.  She does report that she has episodes of anxiety associated with some chest pain, but at this time the pain was different from the anxiety-related chest pain.  Again, she describes the chest pain as pressure-like, rated as 8/10 in intensity, associated with some numbness in her left arm.  She denies any diaphoresis.  No nausea.  No headache.  No abdominal pain.  No diarrhea.  No dysuria.  No constipation.  She does not have any leg swelling currently, although she did report that recently she had an episode of swelling of her legs.  She reports that sometimes while she is at work and she feels stressed and upset, she has similar chest pain described again as a  "pressure or a \"Carlos's horse\" in her chest.  She denies any recent fevers, no coughing, no upper respiratory infection symptoms.      She called 911.  EMS administered aspirin 325 mg p.o., which she states relieved partially her pain and she was brought to ER for further evaluation.      In ER, she was seen by Dr. Trierweiler.  Her initial vitals showed a blood pressure of 147/87, heart rate 83, respiratory rate 16, oxygen saturation 99% on room air and temperature 98.8.  She was given 3 nitroglycerin sublingual, which relieved her chest pain completely.  She was chest pain-free at the time of my examination in ER.      In ER, she had a set of basic blood work which showed a BMP with low potassium of 3.1.  Sodium was 137.4, chloride 100, bicarb 31, BUN 17, creatinine 0.88.  Her liver function tests with albumin 3.3, total protein 7.8, total bilirubin 0.2, alkaline phosphatase 65, ALT 25, AST 21.  Lipase was 260,000.  Initial troponin was negative, less than 0.015.  Glucose 112.  Her white blood cells were 10.4, hemoglobin 12.7, hematocrit 37.2, platelet count 205.  Ethanol level less than 0.01.  Her chest x-ray does not show any focal airspace disease.  Her EKG showed normal sinus rhythm at the rate of 80 beats per minute with premature atrial complexes and some nonspecific T-wave abnormalities.      PAST MEDICAL HISTORY:   1.  Hypertension.   2.  History of mild coronary artery disease back in 2013.  She had an abnormal stress test, which showed mild ischemia in the distal anterior and apical region.  Subsequently, she followed up with Cardiology and she underwent a CT coronary angiogram, which showed a calcium score of 4.54 and evidence of mild coronary artery disease.  Weight loss and lifestyle modification including smoking cessation was recommended at that time as well as blood pressure control.   3.  Obstructive sleep apnea, using CPAP.   4.  Hypothyroidism.   5.  Asthma.   6.  Obesity.   7.  Anxiety.   8. " Tobacco use disorder.  9.  Alcohol abuse.     PAST SURGICAL HISTORY:    Past Surgical History:   Procedure Laterality Date     COLONOSCOPY  10/24/2012    Procedure: COLONOSCOPY;  COLONOSCOPY ;  Surgeon: Sunil Valdes MD;  Location:  GI     CV CORONARY ANGIOGRAM N/A 8/14/2019    Procedure: Coronary Angiogram;  Surgeon: Yordan Flores MD;  Location:  HEART CARDIAC CATH LAB     CV LEFT HEART CATH N/A 8/14/2019    Procedure: Left Heart Cath;  Surgeon: Yordan Flores MD;  Location:  HEART CARDIAC CATH LAB     CV LEFT VENTRICULOGRAM N/A 8/14/2019    Procedure: Left Ventriculogram;  Surgeon: Yordan Flores MD;  Location:  HEART CARDIAC CATH LAB     GYN SURGERY      3 c sections     THYROID SURGERY          SOCIAL HISTORY:  The patient is a current smoker.  She reports smoking 10 cigarettes per day.  She also drinks alcohol.  She reports drinking 3 bottles of vodka weekly.  She does admit that she is using alcohol excessively to help her deal with her anxiety.  She denies other illicit drug use.      FAMILY HISTORY:    Family History     Problem (# of Occurrences) Relation (Name,Age of Onset)    Cerebrovascular Disease (3) Mother, Brother, Sister           PRIOR TO ADMISSION MEDICATIONS:     No current facility-administered medications on file prior to encounter.   Current Outpatient Medications on File Prior to Encounter:  albuterol (PROAIR HFA/PROVENTIL HFA/VENTOLIN HFA) 108 (90 Base) MCG/ACT inhaler Inhale 1-2 puffs into the lungs every 6 hours as needed for shortness of breath / dyspnea or wheezing   ALPRAZolam (XANAX) 0.5 MG tablet Take 0.5 mg by mouth 2 times daily as needed for anxiety   Aspirin Effervescent 325 MG TBEF Take 325 mg by mouth daily (Leatha Yadiel)   carisoprodol (SOMA) 350 MG tablet Take 350 mg by mouth 2 times daily as needed for muscle spasms   fluticasone (FLONASE) 50 MCG/ACT nasal spray Spray 1 spray into both nostrils daily as needed for rhinitis or allergies    hydrochlorothiazide (HYDRODIURIL) 50 MG tablet Take 50 mg by mouth daily   HYDROcodone-acetaminophen (NORCO) 5-325 MG tablet Take 1 tablet by mouth 2 times daily as needed for severe pain   levothyroxine (SYNTHROID/LEVOTHROID) 125 MCG tablet Take 125 mcg by mouth daily   metoprolol tartrate (LOPRESSOR) 50 MG tablet Take 50 mg by mouth daily (Per retail pharmacy, patient's instructions are to take one tablet twice daily but the patient states she only takes one tablet daily)   polyethylene glycol (MIRALAX/GLYCOLAX) powder Take 5 g by mouth every other day Mixes with Metamucil in liquid every other day.   potassium chloride SA (K-DUR,KLOR-CON M) 10 MEQ tablet Take 20 mEq by mouth daily    psyllium (METAMUCIL/KONSYL) 58.6 % powder Take 1 Tablespoonful by mouth every other day Mixes with Miralax in liquid every other day.   venlafaxine (EFFEXOR-XR) 75 MG 24 hr capsule Take 75 mg by mouth daily (Per pharmacy instructions, patient is to take 2 x 75mg for a total of 150mg daily but patient states she only takes 75mg daily)      ALLERGIES:  SHE IS ALLERGIC TO SULFA DRUGS.      REVIEW OF SYSTEMS:  A 10-point review of systems was conducted and it was negative except for pertinent positives mentioned in history of present illness.      PHYSICAL EXAMINATION:   VITAL SIGNS:  Blood pressure is 144/82, heart rate 90, respiratory rate 12, oxygen saturation 98% on room air, temperature 98.8.   GENERAL:  The patient is awake, alert, no acute distress at the time of my examination.   HEENT:  Head is normocephalic, atraumatic.  Pupils are equally round and reactive to light.  Oral mucosa is moist.   NECK:  Supple.  No cervical lymphadenopathy, no thyromegaly.   CHEST:  There is bilateral air entry.  No wheezing, no rales, no crackles.   CARDIOVASCULAR:  There is normal S1, S2, regular rate and rhythm.  No murmurs, no rubs.   ABDOMEN:  Soft, obese, nontender.  Bowel sounds are present.   EXTREMITIES:  There is no leg swelling, no  calf tenderness, 2+ peripheral pulses are palpable.   SKIN:  Intact, no rash, no cyanosis.   NEUROLOGIC:  The patient is awake, alert, oriented to self, place and time.  There are no focal neurological deficits.   PSYCHIATRIC:  She is tearing at times when she describes the stress associated with her work.   MUSCULOSKELETAL:  She moves all extremities freely.  There are no obvious joint deformities.      LABORATORY:  Reviewed and dictated above.      ASSESSMENT:  Ms. Dulce Maria Carrasco is a very pleasant 58-year-old -American lady with past medical history of hypertension, mild coronary artery disease, hypothyroidism, asthma, obesity, obstructive sleep apnea and anxiety who came in for evaluation of chest pain.      PLAN:   1.  Chest pain that started last night around 2:00 a.m., woke her up from sleep, described as left shoulder pain associated with chest pressure and left arm numbness.  The pain was relieved by nitroglycerin.  Her troponin is negative and EKG does not show any ischemic changes.  Chest x-ray does not show any infiltrates.  She has a known history of mild coronary artery disease based on abnormal stress test in 05/2013 and abnormal CT coronary angiogram in 06/2013, which showed a calcium score of 4.54.  Lifestyle modifications were recommended at that time.  The patient continues to have multiple cardiovascular risk factors of hypertension, obesity, smoking, alcohol abuse, and obstructive sleep apnea.  Plan for now is to admit her under observational status.  She will be monitored on telemetry.  Will have serial troponins.  We will check a fasting lipid profile.  Given the fact that she has a known abnormal stress test already, we will just order an echocardiogram at this time to look for wall motion abnormalities and Cardiology consult was requested.  Plan for now is to keep her on aspirin and metoprolol 25 mg p.o. twice daily.  Home medications need to be verified by the pharmacy.   2.   Hypertension.  Apparently, she is on metoprolol.  Again, the dose needs to be verified by the pharmacy.  Meanwhile, we will put her on metoprolol 25 mg p.o. twice daily with holding parameters.   3.  Hypothyroidism.  We will continue with her prior to admission levothyroxine 50 mcg p.o. daily.   4.  Obstructive sleep apnea.  We will order CPAP as per home settings.   5.  Tobacco use disorder.  She reports smoking 10 cigarettes per day.  I have strongly encouraged her to quit smoking.   6.  Alcohol abuse.  She reports drinking 3 bottles of vodka per week.  She is aware that she is abusing alcohol, but states that it helps her deal with her stress and anxiety.  We will monitor her for alcohol withdrawal symptoms.  I did not order CIWA at this time.   7.  Anxiety.  She reported that she takes lorazepam p.r.n.  She also admits drinking alcohol excessively.  A Psychiatry consult was requested.   8.  DVT prophylaxis:  Encouraged ambulation, as I anticipate a short hospital stay.      CODE STATUS:  Discussed with the patient and patient is full code.      DISPOSITION:  Admit under observational status.  I anticipate patient may discharge in the next 24 hours pending cardiology workup and results.         RITA PAK MD             D: 2019   T: 2019   MT: EVER      Name:     SIMONA TRIVEDI   MRN:      [id number]        Account:      [id number]   :      1960        Admitted:     2019                   Document: [id number]       cc: [facility]

## 2019-08-14 NOTE — PROGRESS NOTES
Hospitalist update note    Patient admitted earlier this morning by Dr. Gary.    58 year old female with hx of HTN, ANURAG, tobacco use, and alcohol abuse who presents for evaluation of chest pain. Since admission, she has had several episodes of chest pain at rest, relieved with SL nitro. Serial troponins and EKGs have been negative for ischemia.    - Plan initially was to pursue CT angiogram, but will now proceed with coronary angiogram  - Start thiamine/folate/MVI for history of heavy alcohol use  - Start K/Mg/Phos replacement protocols  - She has been evaluated by Psych, but patient wants to discuss medication changes with her primary psychiatrist. Note, she should be on venlafaxine 150 mg daily, but only takes 75 mg daily. Continues on PTA alprazolam DION Muhammad MD  Hospitalist  [phone]

## 2019-08-14 NOTE — PROVIDER NOTIFICATION
Verbal order from Dr. Muhammad, if no intervention during angio, OK to restart regular diet when returns.

## 2019-08-14 NOTE — PROGRESS NOTES
Angiogram showing no significant CAD.  Ok to discharge to home from CV standpoint.  Will sign off.

## 2019-08-14 NOTE — PROGRESS NOTES
A/Ox4, VSS on RA, chest pain resolved with nitroglycerin x2 at 0908/0920AM. EKG obtained. Trops negative. Angio scheduled for 1430. Cardiology following. K+ replaced recheck 3.6. NS started at 150ml/hr pre-procedure. Pscyh consulted. Up ad brian. Voiding. CPAP at HS. Tele NSR.      PRIOR TO DISCHARGE     Comments: List all goals to be met before discharge home:   - Serial troponins and stress test complete.  NOT MET  - Seen and cleared by consultant if applicable NOT MET  - Adequate pain control on oral analgesia MET  - Vital signs normal or at patient baseline MET  - Safe disposition plan has been identified NOT MET  - Nurse to notify provider when observation goals  have been met and patient is ready for discharge.

## 2019-08-14 NOTE — CONSULTS
"Consult Date:  08/14/2019      REASON FOR CONSULTATION:  Anxiety.      REQUESTED BY:  Melody Gary MD      IDENTIFYING DATA:  The patient is a 58-year-old   female admitted through the emergency room with complaints of chest pain.  She has known atherosclerotic coronary vascular disease with an abnormal stress test back in 2013.  She has a few other medical comorbidities such as COPD, obesity and hypothyroidism.  She also has a distant history of some alcohol abuse.  She came to the ER highly anxious and overwhelmed by her work situation.  She is currently convalescing on the observation unit.      CHIEF COMPLAINT:  \"They are targeting me through the Department of Human Services.\"      HISTORY OF PRESENT ILLNESS:  The patient is a 58-year-old  female with the above stated history who comes in to the observation unit highly anxious, complaining of chest pain.  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  She went into a long winded discussion about this, feels targeted by the state and county, this has brought on a tremendous amount of stress.  She has a history of having some anxiety and depression, having been treated with Effexor, which she utilizes at a dose of 75 mg extended release daily.  She takes Ativan 0.5 mg b.i.d. p.r.n.  She is tearful during the visit, but denies thoughts of self-harm.  She used to see Dr. Hsu in private practice and wishes to return to the care of that provider.      On further questioning, she denies hypomania, johnathon, generalized anxiety, obsessive-compulsive disorder, eating disorder history, trauma history or ADHD.  She alludes to being overwhelmed by her circumstances, describes some panicky feelings that come on in this context.  She has never been " "hospitalized for psychiatric reasons and never had a suicide attempt.  She denies current use of alcohol.  There have been giving her a little bit of p.r.n. Ativan here in the hospital, but did not order her venlafaxine.  She told me that rather than making medication changes, she really prefers to schedule an appointment with her previous outpatient psychiatrist.      PAST PSYCHIATRIC HISTORY:  As above.      PAST CHEMICAL DEPENDENCY HISTORY:  Alcohol use is mentioned in the chart, but she is denying this current concern.      PAST MEDICAL HISTORY:  Hypertension, atherosclerotic coronary vascular disease, obesity, obstructive sleep apnea, hypothyroidism, asthma, chest pain this admission.      PRIOR TO ADMISSION MEDICATIONS:   1.  Effexor ER 75 mg daily, though this was not listed as PTA medication.   2.  Ativan 0.5 mg b.i.d. p.r.n.   3.  Soma.   4.  Lortab.   5.  Metoprolol.    6.  Potassium supplements.   7.  Albuterol inhaler.      FAMILY HISTORY:  She denies mental illness, chemical dependency or suicide.      SOCIAL HISTORY:  The patient is from the [facility], has several siblings and 3 grown children that she is \"trying to send through college\"  She lives locally with her .  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  She describes the situation as \"traumatic.\"      REVIEW OF SYSTEMS:  A 10-point review of systems is unchanged from Dr. Gary's H and P 08/14/2019 at 9:02 a.m.      MOST RECENT VITAL SIGNS:  Temperature 96.4, pulse 91, respiratory rate 16, blood pressure 120/65, oxygen saturation 97%.      MENTAL STATUS EXAMINATION:  Appearance:  The patient is an obese,  female lying in bed.  She is tearful but a good historian.  Speech is of normal rate and flow, tearfulness noted.  Use of language appropriate.  Motor exam calm.  " Affect is labile.  Mood dysphoric.  Thought process logical, coherent and goal directed.  No loosening of associations, no flight of ideas.  No formal thought disorder on exam.  Thought content positive for anxious cognitions and panicky feelings.  Negative for hallucinations, delusions, paranoia, suicidal or homicidal ideation.  Insight and judgment intact.  Cognitive exam:  The patient is alert and oriented x 3.  Concentration intact.  Recent and remote memory intact.  General fund of knowledge above average.      IMPRESSION:  The patient is a pleasant 58-year-old  female presenting with anxiety and chest pain with multiple psychosocial stressors.  I would recommend continuing Effexor, though we talked about whether titration would be an appropriate option to a target dose of 150 to 225 mg daily.  She would prefer to talk with her previous outpatient psychiatrist, Dr. Hsu, to address this.  She is getting p.r.n.  Ativan in the hospital, which is reasonable.  I reordered her venlafaxine.      DIAGNOSES:   1.  Adjustment reaction with mixed anxious/depressed mood.   2.  Rule out panic disorder.   3.  Rule out major depressive disorder.   4.  Chest pain this admission with history of atherosclerotic coronary vascular disease.      PLAN:   1.  Continue Effexor and Ativan as discussed above.   2.  The patient prefers to pursue outpatient support services with her previous psychiatrist, Dr. Hsu, rather than having me make any medication changes.  We did talk about titration of venlafaxine as a future option to mitigate some of her anxiety and alleviate depression.   3.  We discussed therapy needs, she plans to schedule with her outpatient psychiatrist upon discharge.         PHYLLIS BOOTHE MD             D: 2019   T: 2019   MT: LUPILLO      Name:     ISMONA TRIVEDI   MRN:      [id number]        Account:       [id number]   :      1960           Consult Date:  2019       Document: [id number]

## 2019-08-14 NOTE — ED TRIAGE NOTES
Woke up with chest pain that radiates into left shoulder and feels SOB. anxious. Given 324mg ASA by EMS.

## 2019-08-14 NOTE — ED NOTES
Pt continues to deny chest pressure but feeling very anxious. Requesting something to help her relax. Dr. Trierweiler notified and order for ativan received.

## 2019-08-15 ENCOUNTER — TELEPHONE (OUTPATIENT)
Dept: CARDIOLOGY | Facility: CLINIC | Age: 59
End: 2019-08-15

## 2019-08-15 DIAGNOSIS — Z98.890 S/P CORONARY ANGIOGRAM: Primary | ICD-10-CM

## 2019-08-15 LAB — INTERPRETATION ECG - MUSE: NORMAL

## 2019-08-15 NOTE — DISCHARGE SUMMARY
[facility]    Discharge Summary  Hospitalist    Date of Admission:  8/14/2019  Date of Discharge:  8/14/2019  Discharging Provider: Peg Muhammad  Date of Service (when I saw the patient): 08/14/19    Discharge Diagnoses     1. Non-cardiac chest pain  2. Essential hypertension  3. ANURAG  4. Hypothyroidism  5. Tobacco use disorder  6. Alcohol abuse  7. Generalized anxiety disorder      History of Present Illness   Please see H&P by Dr. Gary on 8/14/2019    Hospital Course   58 year old female with hx of HTN, ANURAG, tobacco use, and alcohol abuse who presents for evaluation of chest pain in the context of recent stress and increased anxiety. Serial troponins and EKGs on admission were negative for ischemia. Due to ongoing chest pain relieved with SL nitro and prior abnormal stress test, Cardiology was consulted, and patient ultimately underwent a coronary angiogram which was negative for significant CAD.     - She will be discharged with atorvastatin 40 mg daily, and continues her PTA meds as previously prescribed  - Of note, she was significantly anxious throughout admission. Psychiatry was consulted, but patient declined interventions, and has preferred to have her primary psychiatrist consider medication changes. Of note, she is prescribed venlafaxine 150 mg daily, but only takes 75 mg daily    Peg Muhammad    Significant Results and Procedures   As noted above    Pending Results   None    Code Status   Full Code       Primary Care Physician   Physician No Ref-Primary    Physical Exam   Temp: 97.3  F (36.3  C) Temp src: Oral BP: 117/82 Pulse: 84 Heart Rate: 73 Resp: 18 SpO2: 98 % O2 Device: None (Room air)    Vitals:    08/14/19 0720   Weight: 125.9 kg (277 lb 8 oz)     Vital Signs with Ranges  Temp:  [96.4  F (35.8  C)-98.8  F (37.1  C)] 97.3  F (36.3  C)  Pulse:  [73-98] 84  Heart Rate:  [65-92] 73  Resp:  [10-24] 18  BP: (109-165)/() 117/82  SpO2:  [94 %-100 %] 98 %  I/O last 3  completed shifts:  In: 100 [P.O.:100]  Out: -     Constitutional: Non-toxic, NADi  Cardiovascular: Heart RRR, no m/r/g. No pedal edema.   GI: +BS. Abd soft/NT  Skin: Warm and dry. No rash.  Musculoskeletal: Normal muscle bulk and tone  Neurologic: Alert and appropriate. SCHULTZ  Psychiatric: Anxious    Discharge Disposition   Discharged to home  Condition at discharge: Satisfactory    Consultations This Hospital Stay   CARDIOLOGY IP CONSULT  PSYCHIATRY IP CONSULT  PHARMACY IP CONSULT  PHARMACY IP CONSULT  SMOKING CESSATION PROGRAM IP CONSULT    Time Spent on this Encounter   IPeg, personally saw the patient today and spent less than or equal to 30 minutes discharging this patient.    Discharge Orders      Reason for your hospital stay    You were admitted for chest pain. Your angiogram was without obstructive disease. It is recommended that you take a statin.     Follow-up and recommended labs and tests     Follow up with primary care provider, Physician No Ref-Primary, within 7 days for hospital follow- up.  No follow up labs or test are needed.  Follow up with psychiatrist     Activity    Your activity upon discharge: activity as tolerated     Diet    Follow this diet upon discharge: Orders Placed This Encounter      Advance Diet as Tolerated: Regular Diet Adult     Discharge Medications   Current Discharge Medication List      START taking these medications    Details   atorvastatin (LIPITOR) 40 MG tablet Take 1 tablet (40 mg) by mouth daily  Qty: 30 tablet, Refills: 1    Comments: Future refills by PCP  Physician No Ref-Primary with phone number None.  Associated Diagnoses: Chest pain, unspecified type         CONTINUE these medications which have NOT CHANGED    Details   albuterol (PROAIR HFA/PROVENTIL HFA/VENTOLIN HFA) 108 (90 Base) MCG/ACT inhaler Inhale 1-2 puffs into the lungs every 6 hours as needed for shortness of breath / dyspnea or wheezing    Comments: Pharmacy may dispense brand  covered by insurance (Proair, or proventil or ventolin or generic albuterol inhaler)      ALPRAZolam (XANAX) 0.5 MG tablet Take 0.5 mg by mouth 2 times daily as needed for anxiety      Aspirin Effervescent 325 MG TBEF Take 325 mg by mouth daily (Leatha Cotto)      carisoprodol (SOMA) 350 MG tablet Take 350 mg by mouth 2 times daily as needed for muscle spasms      fluticasone (FLONASE) 50 MCG/ACT nasal spray Spray 1 spray into both nostrils daily as needed for rhinitis or allergies      hydrochlorothiazide (HYDRODIURIL) 50 MG tablet Take 50 mg by mouth daily      HYDROcodone-acetaminophen (NORCO) 5-325 MG tablet Take 1 tablet by mouth 2 times daily as needed for severe pain      levothyroxine (SYNTHROID/LEVOTHROID) 125 MCG tablet Take 125 mcg by mouth daily      metoprolol tartrate (LOPRESSOR) 50 MG tablet Take 50 mg by mouth daily (Per retail pharmacy, patient's instructions are to take one tablet twice daily but the patient states she only takes one tablet daily)      polyethylene glycol (MIRALAX/GLYCOLAX) powder Take 5 g by mouth every other day Mixes with Metamucil in liquid every other day.      potassium chloride SA (K-DUR,KLOR-CON M) 10 MEQ tablet Take 20 mEq by mouth daily       psyllium (METAMUCIL/KONSYL) 58.6 % powder Take 1 Tablespoonful by mouth every other day Mixes with Miralax in liquid every other day.      venlafaxine (EFFEXOR-XR) 75 MG 24 hr capsule Take 75 mg by mouth daily (Per pharmacy instructions, patient is to take 2 x 75mg for a total of 150mg daily but patient states she only takes 75mg daily)           Allergies   Allergies   Allergen Reactions     Sulfa Drugs Hives     Data   Most Recent 3 CBC's:  Recent Labs   Lab Test 08/14/19  0451 09/01/15  1416 05/22/13  1830   WBC 10.4 8.1 8.0   HGB 12.7 12.7 11.6*   MCV 94 88 88    261 262      Most Recent 3 BMP's:  Recent Labs   Lab Test 08/14/19  1707 08/14/19  1201 08/14/19  0451 09/01/15  1416 05/22/13  1830   NA  --   --  137 138 138    POTASSIUM  --  3.6 3.1* 3.8 3.7   CHLORIDE  --   --  100 105 101   CO2  --   --  31 29 27   BUN  --   --  17 14 17   CR 0.76  --  0.88 0.94 0.87   ANIONGAP  --   --  6 4 10   ALBERTO  --   --  8.5 8.5 8.7   GLC  --   --  112* 118* 103*     Most Recent 2 LFT's:  Recent Labs   Lab Test 08/14/19  0451 06/19/13  0000   AST 21  --    ALT 25 9   ALKPHOS 65  --    BILITOTAL 0.2  --      Most Recent INR's and Anticoagulation Dosing History:  Anticoagulation Dose History     Recent Dosing and Labs Latest Ref Rng & Units 12/23/2007    INR 0.86 - 1.14 0.97        Most Recent 3 Troponin's:  Recent Labs   Lab Test 08/14/19  1201 08/14/19  0748 08/14/19  0451   TROPI <0.015 <0.015 <0.015     Most Recent Cholesterol Panel:  Recent Labs   Lab Test 08/14/19  0748   CHOL 155   LDL 83   HDL 51   TRIG 106     Most Recent 6 Bacteria Isolates From Any Culture (See EPIC Reports for Culture Details):No lab results found.  Most Recent TSH, T4 and A1c Labs:No lab results found.  Results for orders placed or performed during the hospital encounter of 08/14/19   XR Chest 2 Views    Narrative    XR CHEST 2 VW  8/14/2019 5:08 AM     INDICATION: Chest pain.    COMPARISON: 1/11/2014.      Impression    IMPRESSION: Shallow inspiration accentuates the heart size. No focal  air-space disease or other acute findings.    OMEGA BORGES MD

## 2019-08-15 NOTE — TELEPHONE ENCOUNTER
Patient was evaluated by cardiology while inpatient for chest pain and left arm pain. 8/14/19 coronary angiogram via [facility] shows normal coronary arteries-no intervention. Started on Lipitor prior to discharge. Writer attempted to call patient to discuss any post hospital d/c questions she may have, review medication changes, and confirm f/u appts, but no answer. VM left to return my phone call. RN will confirm with patient that she needs to schedule for a cardiology KJ f/u ANTONIO Suarez RN.

## 2019-08-15 NOTE — TELEPHONE ENCOUNTER
Second attempt at trying to contact pt, and again, no answer. VM left to return my phone call. JONATHAN Suarez RN.

## 2019-08-22 NOTE — TELEPHONE ENCOUNTER
Patient was evaluated by cardiology while inpatient. Called patient to discuss any post hospital d/c questions, review medication changes, and confirm f/u appts. Patient denied any questions regarding new medications or changes to PTA medications. Patient denied any SOB, chest pain, fever or light headedness. Patient is currently in [place]. Right wrist cardiac cath site is without bleeding, swelling, redness or tenderness Order placed for 7-10 follow up post angiogram.Pt will call when she returns from her trip to schedule her appt. Patient advised to call clinic with any cardiac related questions or concerns prior to this bhavna't. Patient verbalized understanding and agreed with plan.

## 2020-12-15 NOTE — Clinical Note
270
Catheter removed over wire.  
Catheter removed over wire.  
Family has been updated.  
LCA Cine(s)  injected 
LCA Cine(s)  injected 
Lab results reviewed and abnormals discussed with physician.  
Patient education provided.   
Patient education provided.   
Procedure scheduled as Elective.  
RCA Cine(s)  injected 
Sheath prepped and inserted in the right radial artery.  
Total contrast volume (ml) 80  
left ventricle Cine(s)  injected 
Render Post-Care Instructions In Note?: yes
Post-Care Instructions: I reviewed with the patient in detail post-care instructions. Patient is to wear sunprotection, and avoid picking at any of the treated lesions. Pt may apply Vaseline to crusted or scabbing areas.
Include Z78.9 (Other Specified Conditions Influencing Health Status) As An Associated Diagnosis?: No
Detail Level: Detailed
Medical Necessity Clause: This procedure was medically necessary because the lesions that were treated were:
Consent: The patient's consent was obtained including but not limited to risks of crusting, scabbing, blistering, scarring, darker or lighter pigmentary change, recurrence, incomplete removal and infection.
Medical Necessity Information: It is in your best interest to select a reason for this procedure from the list below. All of these items fulfill various CMS LCD requirements except the new and changing color options.

## 2022-01-09 NOTE — PROGRESS NOTES
Discharge instructions given. Questions and concerns answered. IV out. Pt is discharged home with .   Regular rate and rhythm, Heart sounds S1 S2 present, no murmurs, rubs or gallops

## (undated) DEVICE — WIRE GUIDE 0.035"X260CM SAFE-T-J EXCHANGE G00517

## (undated) DEVICE — NDL PERC ENTRY 21GA 4CM G00280

## (undated) DEVICE — INTRO GLIDESHEATH SLENDER 6FR 10X45CM 60-1060

## (undated) DEVICE — DEFIB PRO-PADZ LVP LQD GEL ADULT 8900-2105-01

## (undated) DEVICE — MANIFOLD KIT ANGIO AUTOMATED 014613

## (undated) DEVICE — SLEEVE TR BAND RADIAL COMPRESSION DEVICE 24CM TRB24-REG

## (undated) DEVICE — TOTE ANGIO CORP PC15AT SAN32CC83O

## (undated) DEVICE — Device

## (undated) DEVICE — KIT HAND CONTROL ANGIOTOUCH ACIST 65CM AT-P65

## (undated) DEVICE — CATH JACKY 5FR 3.5 CURVE 40-5023

## (undated) DEVICE — CATH ANGIO INFINITI PIGTAIL 145 6 SH 6FRX110CM  534-652S

## (undated) RX ORDER — FENTANYL CITRATE 50 UG/ML
INJECTION, SOLUTION INTRAMUSCULAR; INTRAVENOUS
Status: DISPENSED
Start: 2019-08-14